# Patient Record
(demographics unavailable — no encounter records)

---

## 2024-10-07 NOTE — VITALS
[] : No [de-identified] : 0/10   No pain currently, but tender medial and lateral portion of foot with use of CAM Boot.

## 2024-10-07 NOTE — PHYSICAL EXAM
[4 x 4] : 4 x 4  [2+] : left 2+ [Ankle Swelling (On Exam)] : not present [Varicose Veins Of Lower Extremities] : not present [] : not present [Skin Ulcer] : no ulcer [de-identified] : A&Ox3, NAD [de-identified] : Right foot status post Lapidus bunionectomy, Akin bunionectomy, second and third tarsometatarsal joint fusion.  Minimal tenderness to palpation and range of motion of the right foot surgical sites. [de-identified] : Right foot incisions intact, no dehiscence, no erythema, no purulence, no fluctuance, no proximal streaking, no dehiscence. [de-identified] : Lites and sensation intact bilaterally [FreeTextEntry1] : Right Dorsal Foot [FreeTextEntry2] : incision line [FreeTextEntry4] : dry scab [de-identified] : DOS 8/6/24  -  right foot bunion and foot deformitiy repair  revision [de-identified] : NONE [de-identified] : Edema  [de-identified] :  Mechanically cleansed with NS 0.9%, Sterile Gauze  Kerlix and 4x4 to protect Scab with  CAM Boot. Pt to use Cotton Sock with transition into sneaker. [FreeTextEntry7] : Right Medial Foot [FreeTextEntry8] :  incision line [de-identified] : Dry scab [de-identified] : DOS  8/6/24  -  right foot bunion and foot deformity repair  [de-identified] : Edema  [de-identified] : NONE [de-identified] :  Mechanically cleansed with NS 0.9%, Sterile Gauze  Kerlix and 4x4 to protect Scab with  CAM Boot. Pt to use Cotton Sock with transition into sneaker. [TWNoteComboBox4] : None [TWNoteComboBox6] : Surgical [de-identified] : Primary Dressing [de-identified] : None [de-identified] : Surgical

## 2024-10-07 NOTE — ASSESSMENT
[Foot Care] : foot care [Skin Care] : skin care [Signs and symptoms of infection] : sign and symptoms of infection [Nutrition] : nutrition [How and When to Call] : how and when to call [Pain Management] : pain management [Patient responsibility to plan of care] : patient responsibility to plan of care [Stable] : stable [Home] : Home [Cane] : Cane [Not Applicable - Long Term Care/Home Health Agency] : Long Term Care/Home Health Agency: Not Applicable [] : No [FreeTextEntry2] : Infection Prevention Wound care and Dressing changes Promote and Restore optimal skin integrity Nutrition and Wound Healing  Offloading and Pressure relief Protect and Guard wound site  Maintain acceptable levels of Pain Compliance R/T Compression therapy Compliance R/T Elevation of Lower Extremities [FreeTextEntry4] : No dressing needed, Cotton sock with use of sneaker. Patient verbalized understanding of all discussed. Patient to return to [facility] in Two weeks

## 2024-10-07 NOTE — PLAN
[FreeTextEntry1] : Patient examined and evaluated this time.  All questions answered satisfaction patient verbalized understanding.  Patient to continue in regular shoe gear as tolerated.  Spent 20 minutes on patient care and medical decision making.  Patient to follow-up in 4 weeks.

## 2024-10-07 NOTE — HISTORY OF PRESENT ILLNESS
[FreeTextEntry1] : 71-year-old female seen for complaint of pain to the right foot.  Patient relates that she had a bunionectomy procedure as well as removal of arthritic bone approximately 5 years ago to the right foot.  Patient relates that she has have pain and stiffness to her right foot and points to the right first metatarsal phalangeal joint, as well as a right dorsal midfoot.  Patient describes the pain to be pain, stiffness, and throbbing when she is walking, and relates that she has been compensating giving her pain to her hip and back.  Patient relates that she has tried various shoe gear and also has had custom orthotics in the past, but her symptoms remain at this time.  10/7/24 patient seen status post right foot removal of hardware with Lapidus bunionectomy, Akin bunionectomy, second and third tarsometatarsal joint fusion (DOS: 8/6/2024).  Patient relates that she has been walking in regular shoe gear.  Complains of intermittent and residual pain and swelling.  Patient relates that her symptoms have slowly been improving.

## 2024-10-28 NOTE — PHYSICAL EXAM
[4 x 4] : 4 x 4  [2+] : left 2+ [Ankle Swelling (On Exam)] : not present [Varicose Veins Of Lower Extremities] : not present [] : not present [Skin Ulcer] : no ulcer [de-identified] : A&Ox3, NAD [de-identified] : Right foot status post Lapidus bunionectomy, Akin bunionectomy, second and third tarsometatarsal joint fusion.  Minimal tenderness to palpation and range of motion of the right foot surgical sites. [de-identified] : Right foot incisions intact, no dehiscence, no erythema, no purulence, no fluctuance, no proximal streaking, no dehiscence. [de-identified] : Lites and sensation intact bilaterally [FreeTextEntry1] : Right Dorsal Foot [FreeTextEntry2] : incision line [FreeTextEntry4] : dry scab [de-identified] : DOS 8/6/24  -  right foot bunion and foot deformitiy repair  revision [de-identified] : Edema  [de-identified] : NONE [FreeTextEntry7] : Right Medial Foot [FreeTextEntry8] :  incision line [de-identified] : Dry scab [de-identified] : DOS  8/6/24  -  right foot bunion and foot deformity repair  [de-identified] : Edema  [de-identified] : NONE [TWNoteComboBox4] : None [TWNoteComboBox6] : Surgical [de-identified] : None [de-identified] : Surgical

## 2024-10-28 NOTE — HISTORY OF PRESENT ILLNESS
[FreeTextEntry1] : 71-year-old female seen for complaint of pain to the right foot.  Patient relates that she had a bunionectomy procedure as well as removal of arthritic bone approximately 5 years ago to the right foot.  Patient relates that she has have pain and stiffness to her right foot and points to the right first metatarsal phalangeal joint, as well as a right dorsal midfoot.  Patient describes the pain to be pain, stiffness, and throbbing when she is walking, and relates that she has been compensating giving her pain to her hip and back.  Patient relates that she has tried various shoe gear and also has had custom orthotics in the past, but her symptoms remain at this time.  10/28/24 patient seen status post right foot removal of hardware with Lapidus bunionectomy, Akin bunionectomy, second and third tarsometatarsal joint fusion (DOS: 8/6/2024).  Patient relates that she has been walking in regular shoe gear.  Complains of intermittent and residual pain and swelling but has been improving.

## 2024-10-28 NOTE — ASSESSMENT
[Verbal] : Verbal [Demo] : Demo [Patient] : Patient [Good - alert, interested, motivated] : Good - alert, interested, motivated [Verbalizes knowledge/Understanding] : Verbalizes knowledge/understanding [Foot Care] : foot care [Skin Care] : skin care [Signs and symptoms of infection] : sign and symptoms of infection [Nutrition] : nutrition [How and When to Call] : how and when to call [Pain Management] : pain management [Patient responsibility to plan of care] : patient responsibility to plan of care [Stable] : stable [Home] : Home [Cane] : Cane [Not Applicable - Long Term Care/Home Health Agency] : Long Term Care/Home Health Agency: Not Applicable [] : No [FreeTextEntry2] : Infection Prevention Wound care and Dressing changes Promote and Restore optimal skin integrity Nutrition and Wound Healing  Offloading and Pressure relief Protect and Guard wound site  Maintain acceptable levels of Pain Compliance R/T Compression therapy Compliance R/T Elevation of Lower Extremities [FreeTextEntry4] : No dressing needed, Cotton sock with use of sneaker. Patient verbalized understanding of all discussed. Patient to return to [facility] in 1-2 months

## 2024-12-06 NOTE — HISTORY OF PRESENT ILLNESS
[FreeTextEntry1] : 71-year-old female seen for complaint of pain to the right foot.  Patient relates that she had a bunionectomy procedure as well as removal of arthritic bone approximately 5 years ago to the right foot.  Patient relates that she has have pain and stiffness to her right foot and points to the right first metatarsal phalangeal joint, as well as a right dorsal midfoot.  Patient describes the pain to be pain, stiffness, and throbbing when she is walking, and relates that she has been compensating giving her pain to her hip and back.  Patient relates that she has tried various shoe gear and also has had custom orthotics in the past, but her symptoms remain at this time.  12/5/24 patient seen status post right foot removal of hardware with Lapidus bunionectomy, Akin bunionectomy, second and third tarsometatarsal joint fusion (DOS: 8/6/2024).  Patient relates that she has been walking in regular shoe gear.  Patient relates that she was on her feet significantly due to the recent Thanksgiving holiday weekend.  Patient relates that she has pain and points along the arch of the foot and notes that she has some residual numbness between her right big toe and her second toe.

## 2024-12-06 NOTE — VITALS
[Throbbing] : throbbing [Tender] : tender [] : No [de-identified] : 6/10 [FreeTextEntry3] : Right Foot, Medial aspect  [FreeTextEntry1] : Rest, elevation  [FreeTextEntry2] : Walking for over 30 minutes, standing for "a while" [FreeTextEntry4] : Rest,   Not walking long periods.

## 2024-12-06 NOTE — PHYSICAL EXAM
[2+] : left 2+ [Ankle Swelling (On Exam)] : not present [Varicose Veins Of Lower Extremities] : not present [] : not present [Skin Ulcer] : no ulcer [de-identified] : A&Ox3, NAD [de-identified] : Right foot status post Lapidus bunionectomy, Akin bunionectomy, second and third tarsometatarsal joint fusion.  Minimal tenderness to palpation and range of motion of the right foot surgical sites. [de-identified] : Right foot incisions intact, no dehiscence, no erythema, no purulence, no fluctuance, no proximal streaking, no dehiscence. [de-identified] : Lites and sensation intact bilaterally [FreeTextEntry1] : Dorsal Foot -HEALED -  Akin Bunionectomy / Hardware removal  [de-identified] : DOS 8/6/24 - Akin Bunionectomy [de-identified] : NONE [FreeTextEntry7] : Medial Foot - HEALED - Akin Bunionectomy / Hardware removal  [de-identified] : DOS  8/6/24 [de-identified] : NONE [TWNoteComboBox1] : Right [TWNoteComboBox4] : None [TWNoteComboBox6] : Surgical [TWNoteComboBox9] : Right [de-identified] : Surgical

## 2024-12-06 NOTE — VITALS
[Throbbing] : throbbing [Tender] : tender [] : No [de-identified] : 6/10 [FreeTextEntry3] : Right Foot, Medial aspect  [FreeTextEntry1] : Rest, elevation  [FreeTextEntry2] : Walking for over 30 minutes, standing for "a while" [FreeTextEntry4] : Rest,   Not walking long periods.

## 2024-12-06 NOTE — PHYSICAL EXAM
[2+] : left 2+ [Ankle Swelling (On Exam)] : not present [Varicose Veins Of Lower Extremities] : not present [] : not present [Skin Ulcer] : no ulcer [de-identified] : A&Ox3, NAD [de-identified] : Right foot status post Lapidus bunionectomy, Akin bunionectomy, second and third tarsometatarsal joint fusion.  Minimal tenderness to palpation and range of motion of the right foot surgical sites. [de-identified] : Right foot incisions intact, no dehiscence, no erythema, no purulence, no fluctuance, no proximal streaking, no dehiscence. [de-identified] : Lites and sensation intact bilaterally [FreeTextEntry1] : Dorsal Foot -HEALED -  Akin Bunionectomy / Hardware removal  [de-identified] : DOS 8/6/24 - Akin Bunionectomy [de-identified] : NONE [FreeTextEntry7] : Medial Foot - HEALED - Akin Bunionectomy / Hardware removal  [de-identified] : DOS  8/6/24 [de-identified] : NONE [TWNoteComboBox1] : Right [TWNoteComboBox4] : None [TWNoteComboBox6] : Surgical [TWNoteComboBox9] : Right [de-identified] : Surgical

## 2024-12-06 NOTE — PLAN
[FreeTextEntry1] : Patient examined and evaluated this time.  All questions answered satisfaction patient verbalized understanding.  Patient advised regarding appropriate shoe gear, as well as over-the-counter inserts.  Patient sent for new radiographs of the right foot at this time.  Spent 20 minutes on patient care and medical decision making.  Patient to follow-up in 4 weeks.

## 2024-12-06 NOTE — ASSESSMENT
[Verbal] : Verbal [Demo] : Demo [Patient] : Patient [Good - alert, interested, motivated] : Good - alert, interested, motivated [Verbalizes knowledge/Understanding] : Verbalizes knowledge/understanding [Foot Care] : foot care [Skin Care] : skin care [Signs and symptoms of infection] : sign and symptoms of infection [Nutrition] : nutrition [How and When to Call] : how and when to call [Pain Management] : pain management [Patient responsibility to plan of care] : patient responsibility to plan of care [Stable] : stable [Home] : Home [Not Applicable - Long Term Care/Home Health Agency] : Long Term Care/Home Health Agency: Not Applicable [Spouse] : Spouse [Pressure relief] : pressure relief [Ambulatory] : Ambulatory [] : No [FreeTextEntry2] : Infection Prevention Promote and Restore optimal skin integrity Nutrition and Wound Healing  Offloading and Pressure relief Protect and Guard wound site  Maintain acceptable levels of Pain Compliance R/T Elevation of Lower Extremities [FreeTextEntry3] : c/o pain medial aspect of mid-foot [FreeTextEntry4] : DPM assessed wound site and discussed pain relief / support of foot Pt. recommended to purchase inserts "Super Feet" or "Power Step" Pt. to have Xray prior to next visit.  Requisition given for same. Patient verbalized understanding of all discussed. Patient to return to [facility] in Two to Four Weeks